# Patient Record
Sex: FEMALE | Race: WHITE | URBAN - METROPOLITAN AREA
[De-identification: names, ages, dates, MRNs, and addresses within clinical notes are randomized per-mention and may not be internally consistent; named-entity substitution may affect disease eponyms.]

---

## 2019-11-15 ENCOUNTER — HOSPITAL ENCOUNTER (OUTPATIENT)
Age: 59
Discharge: HOME | End: 2019-11-17
Payer: COMMERCIAL

## 2019-11-15 DIAGNOSIS — N75.0 BARTHOLIN GLAND CYST: ICD-10-CM

## 2019-11-15 PROCEDURE — 87070 CULTURE OTHR SPECIMN AEROBIC: CPT

## 2019-11-18 LAB — GENITAL CULTURE, ROUTINE: NORMAL

## 2019-12-06 ENCOUNTER — HOSPITAL ENCOUNTER (OUTPATIENT)
Age: 59
Discharge: HOME | End: 2019-12-08
Payer: COMMERCIAL

## 2019-12-06 DIAGNOSIS — N89.8 VAGINAL DISCHARGE: ICD-10-CM

## 2019-12-06 PROCEDURE — 87070 CULTURE OTHR SPECIMN AEROBIC: CPT

## 2019-12-10 LAB — GENITAL CULTURE, ROUTINE: NORMAL

## 2020-11-10 ENCOUNTER — TELEPHONE (OUTPATIENT)
Dept: PHARMACY | Facility: CLINIC | Age: 60
End: 2020-11-10

## 2020-11-10 NOTE — TELEPHONE ENCOUNTER
CLINICAL PHARMACY: ADHERENCE REVIEW  Identified care gap per [employer]; fills at [facility]: Statin adherence    Last Office Visit: unknown    STATIN ADHERENCE  PDC: 90%  Needs 46 days to be adherent    Per Insurance Records and Reconciled Dispense Report   ATORVASTATIN TAB 40MG last filled on 6/8/20 for a 90 day supply; due 9/6/20    Per GE: Did not attempt. Pharmacy known as uncooperative. No results found for: CHOL, TRIG, HDL, LDLCHOLESTEROL, LDLCALC, LDLDIRECT  No results found for: ALT, AST  The ASCVD Risk score (Sarai Waldrop, et al., 2013) failed to calculate for the following reasons:    Cannot find a previous HDL lab    Cannot find a previous total cholesterol lab     PLAN  Called patient regarding statin refill. VM announcement states you can leave a message but she most likely won't receive it(?)    Will send reminder letter to refill.

## 2020-11-10 NOTE — TELEPHONE ENCOUNTER
CLINICAL PHARMACY CONSULT: MED RECONCILIATION/REVIEW ADDENDUM    For Pharmacy Admin Tracking Only    PHSO: Yes  Total # of Interventions Recommended: 1  - New Order #: 0 New Medication Order Reason(s): Adherence  - Maintenance Safety Lab Monitoring #: 0  - New Therapy Lab Monitoring #: 0  Recommended intervention potential cost savings: 0  Total Interventions Accepted: 0  Time Spent (min): 15    Mee Montenegro CPhT.   [address]

## 2022-11-15 ENCOUNTER — TELEPHONE (OUTPATIENT)
Dept: PHARMACY | Facility: CLINIC | Age: 62
End: 2022-11-15

## 2022-11-15 NOTE — TELEPHONE ENCOUNTER
[facility] CLINICAL PHARMACY: ADHERENCE REVIEW  Identified care gap per [employer]: fills at [facility]: Statin adherence    Last Visit: 9/15/22 with Nneka Villareal per [facility] [address] [facility] - affiliate provider    Patient identified as LIS = 1, therefore patient may be able to receive a 90-day supply for the same cost as a 30-day supply    Patient found in Outcomes MTM and is not currently eligible for CMR/TIP    ASSESSMENT  [id number] Records claims through 11/5/22 (YTD [facility] =  84%; Potential Fail Date: 11/27/22 ):   Rosuvastatin 10mg last filled on 7/4/22 for 90 day supply. Next refill due: 10/5/22    [facility]: last filled on 11/7/22 for 90 day supply. Aleta Horton historically not receptive to calls to verify fill hx    No results found for: CHOL, TRIG, HDL, LDLCHOLESTEROL, LDLCALC, LDLDIRECT  No results found for: ALT, AST  The ASCVD Risk score (Rowley DK, et al., 2019) failed to calculate for the following reasons: The systolic blood pressure is missing    Cannot find a previous HDL lab    Cannot find a previous total cholesterol lab     PLAN  The following are interventions that have been identified:   - Patient overdue refilling rosuvastatin 10mg daily and active on home medication list.   Was due @10/5, may have been refilled @11/7 at [facility]-did she ? Attempting to reach patient to review. Left message asking for return call. No future appointments.     Demetrice Bonilla, PharmD, [address] [place] [address], toll free: [phone], option 1

## 2022-11-15 NOTE — TELEPHONE ENCOUNTER
Noted patient's return call, thank you. As noted, appears prescriber/PCP is affiliated with [facility] ([facility]  Will close encounter.    =======================================================   For Pharmacy Admin Tracking Only    Gap Closed?: Yes   Time Spent (min): 15

## 2022-11-15 NOTE — TELEPHONE ENCOUNTER
Patient returned call, she confirms she is currently taking the Rosuvastatin as directed and confirmed she picked up a 90 day supply on 11/7/22. Patient confused as to how we got her medical information and was calling her regarding her medication. Patient states she does not see any [employer] and is concerned with our access to her personal information. I did explain who we are and the purpose for our call. Patient states she understands, but insists that she should not be on any of our lists and wishes that we not contact her in the future. She will call [employer] to verify they have the correct provider information on file for her.